# Patient Record
Sex: MALE | Race: WHITE
[De-identification: names, ages, dates, MRNs, and addresses within clinical notes are randomized per-mention and may not be internally consistent; named-entity substitution may affect disease eponyms.]

---

## 2019-01-28 ENCOUNTER — HOSPITAL ENCOUNTER (EMERGENCY)
Dept: HOSPITAL 47 - EC | Age: 79
Discharge: HOME | End: 2019-01-28
Payer: MEDICARE

## 2019-01-28 VITALS — TEMPERATURE: 97.4 F | RESPIRATION RATE: 18 BRPM

## 2019-01-28 VITALS — HEART RATE: 74 BPM | SYSTOLIC BLOOD PRESSURE: 179 MMHG | DIASTOLIC BLOOD PRESSURE: 89 MMHG

## 2019-01-28 DIAGNOSIS — Z79.02: ICD-10-CM

## 2019-01-28 DIAGNOSIS — R31.0: Primary | ICD-10-CM

## 2019-01-28 DIAGNOSIS — Z79.899: ICD-10-CM

## 2019-01-28 DIAGNOSIS — Z79.890: ICD-10-CM

## 2019-01-28 DIAGNOSIS — N28.1: ICD-10-CM

## 2019-01-28 DIAGNOSIS — I10: ICD-10-CM

## 2019-01-28 DIAGNOSIS — Z79.82: ICD-10-CM

## 2019-01-28 LAB
ALBUMIN SERPL-MCNC: 3.8 G/DL (ref 3.5–5)
ALP SERPL-CCNC: 35 U/L (ref 38–126)
ALT SERPL-CCNC: 32 U/L (ref 21–72)
ANION GAP SERPL CALC-SCNC: 5 MMOL/L
AST SERPL-CCNC: 19 U/L (ref 17–59)
BASOPHILS # BLD AUTO: 0.1 K/UL (ref 0–0.2)
BASOPHILS NFR BLD AUTO: 1 %
BUN SERPL-SCNC: 28 MG/DL (ref 9–20)
CALCIUM SPEC-MCNC: 9.4 MG/DL (ref 8.4–10.2)
CHLORIDE SERPL-SCNC: 106 MMOL/L (ref 98–107)
CO2 SERPL-SCNC: 28 MMOL/L (ref 22–30)
EOSINOPHIL # BLD AUTO: 0.2 K/UL (ref 0–0.7)
EOSINOPHIL NFR BLD AUTO: 3 %
ERYTHROCYTE [DISTWIDTH] IN BLOOD BY AUTOMATED COUNT: 4.74 M/UL (ref 4.3–5.9)
ERYTHROCYTE [DISTWIDTH] IN BLOOD: 13 % (ref 11.5–15.5)
GLUCOSE SERPL-MCNC: 107 MG/DL (ref 74–99)
HCT VFR BLD AUTO: 42.9 % (ref 39–53)
HGB BLD-MCNC: 14.3 GM/DL (ref 13–17.5)
LYMPHOCYTES # SPEC AUTO: 1 K/UL (ref 1–4.8)
LYMPHOCYTES NFR SPEC AUTO: 15 %
MCH RBC QN AUTO: 30.2 PG (ref 25–35)
MCHC RBC AUTO-ENTMCNC: 33.3 G/DL (ref 31–37)
MCV RBC AUTO: 90.6 FL (ref 80–100)
MONOCYTES # BLD AUTO: 0.4 K/UL (ref 0–1)
MONOCYTES NFR BLD AUTO: 6 %
NEUTROPHILS # BLD AUTO: 5.1 K/UL (ref 1.3–7.7)
NEUTROPHILS NFR BLD AUTO: 74 %
PH UR: 6 [PH] (ref 5–8)
PLATELET # BLD AUTO: 203 K/UL (ref 150–450)
POTASSIUM SERPL-SCNC: 4.7 MMOL/L (ref 3.5–5.1)
PROT SERPL-MCNC: 6.3 G/DL (ref 6.3–8.2)
PROT UR QL: (no result)
RBC UR QL: 3 /HPF (ref 0–5)
SODIUM SERPL-SCNC: 139 MMOL/L (ref 137–145)
SP GR UR: 1 (ref 1–1.03)
SQUAMOUS UR QL AUTO: <1 /HPF (ref 0–4)
UROBILINOGEN UR QL STRIP: <2 MG/DL (ref ?–2)
WBC # BLD AUTO: 6.9 K/UL (ref 3.8–10.6)
WBC #/AREA URNS HPF: 3 /HPF (ref 0–5)

## 2019-01-28 PROCEDURE — 80053 COMPREHEN METABOLIC PANEL: CPT

## 2019-01-28 PROCEDURE — 51798 US URINE CAPACITY MEASURE: CPT

## 2019-01-28 PROCEDURE — 36415 COLL VENOUS BLD VENIPUNCTURE: CPT

## 2019-01-28 PROCEDURE — 85025 COMPLETE CBC W/AUTO DIFF WBC: CPT

## 2019-01-28 PROCEDURE — 82550 ASSAY OF CK (CPK): CPT

## 2019-01-28 PROCEDURE — 76770 US EXAM ABDO BACK WALL COMP: CPT

## 2019-01-28 PROCEDURE — 96360 HYDRATION IV INFUSION INIT: CPT

## 2019-01-28 PROCEDURE — 81001 URINALYSIS AUTO W/SCOPE: CPT

## 2019-01-28 PROCEDURE — 99283 EMERGENCY DEPT VISIT LOW MDM: CPT

## 2019-01-28 NOTE — US
EXAMINATION TYPE: US kidneys/renal and bladder

 

DATE OF EXAM: 1/28/2019

 

COMPARISON:

 

CLINICAL HISTORY: Pain. Hematuria, burning during urination

 

EXAM MEASUREMENTS:

 

Right Kidney:  8.4 x 4.9 x 4.8 cm

Left Kidney: 9.4 x 4.5 x 5.2 cm

 

 

 

Right Kidney: Cortical cystic appearing lesion seen lower pole = 0.8 x 0.6 x 0.6 cm.  Cortical thinni
ng.     

Left Kidney: Cortical thinning.    

Bladder: distended, wnl as visualized.  Prominent prostate visualized.  

**Bilateral Jets seen

 

 

There is no evidence for hydronephrosis at this point in time.  No nephrolithiasis is seen.  No solid
 masses are identified.  The urinary bladder is anechoic.  Bilateral ureteral jets are seen.

 

 

 

IMPRESSION:

1. Renal parenchymal thinning noted.

2. Right renal cyst identified.

## 2019-01-28 NOTE — ED
General Adult HPI





- General


Chief complaint: Urogenital


Stated complaint: Rectal Bleeding


Time Seen by Provider: 01/28/19 10:02


Source: patient, RN notes reviewed


Mode of arrival: ambulatory


Limitations: no limitations





- History of Present Illness


Initial comments: 





78-year-old male presents to the emergency department for a chief complaint of 

blood in urine.  Patient states this started last night.  Patient states he 

passes clots in his urine.  He states it is sometimes difficult to initiate 

stream of urine due to these clots.  Patient denies any significant pain.  

Patient has had prostate surgery in the past. Patient has no other complaints 

at this time including shortness of breath, chest pain, abdominal pain, nausea 

or vomiting, headache, or visual changes.





- Related Data


 Home Medications











 Medication  Instructions  Recorded  Confirmed


 


Aspirin EC [Ecotrin Low Dose] 81 mg PO DAILY 01/28/19 01/28/19


 


Carvedilol [Coreg] 12.5 mg PO BID 01/28/19 01/28/19


 


Cholecalciferol (Vitamin D3) 2,000 unit PO DAILY 01/28/19 01/28/19





[Vitamin D3]   


 


Levothyroxine Sodium [Synthroid] 25 mcg PO DAILY 01/28/19 01/28/19


 


Olmesartan Medoxomil [Benicar] 40 mg PO DAILY 01/28/19 01/28/19


 


Pravastatin Sodium [Pravachol] 20 mg PO DAILY 01/28/19 01/28/19


 


amLODIPine [Norvasc] 10 mg PO DAILY 01/28/19 01/28/19











 Allergies











Allergy/AdvReac Type Severity Reaction Status Date / Time


 


No Known Allergies Allergy   Verified 01/28/19 09:56














Review of Systems


ROS Statement: 


Those systems with pertinent positive or pertinent negative responses have been 

documented in the HPI.





ROS Other: All systems not noted in ROS Statement are negative.





Past Medical History


Past Medical History: GI Bleed, Hypertension, Prostate Disorder


History of Any Multi-Drug Resistant Organisms: None Reported


Past Surgical History: Prostate Surgery


Past Anesthesia/Blood Transfusion Reactions: No Reported Reaction


Past Psychological History: No Psychological Hx Reported


Smoking Status: Never smoker


Past Alcohol Use History: None Reported


Past Drug Use History: None Reported





General Exam


Limitations: no limitations


General appearance: alert, in no apparent distress


Head exam: Present: atraumatic, normocephalic, normal inspection


Eye exam: Present: normal appearance, PERRL, EOMI.  Absent: scleral icterus, 

conjunctival injection, periorbital swelling


ENT exam: Present: normal exam, mucous membranes moist


Neck exam: Present: normal inspection, full ROM.  Absent: tenderness, 

meningismus, lymphadenopathy


Respiratory exam: Present: normal lung sounds bilaterally.  Absent: respiratory 

distress, wheezes, rales, rhonchi, stridor


Cardiovascular Exam: Present: regular rate, normal rhythm, normal heart sounds.

  Absent: systolic murmur, diastolic murmur, rubs, gallop, clicks


GI/Abdominal exam: Present: soft, normal bowel sounds.  Absent: distended, 

tenderness, guarding, rebound, rigid


Neurological exam: Present: alert, oriented X3, CN II-XII intact


Psychiatric exam: Present: normal affect, normal mood





Course


 Vital Signs











  01/28/19 01/28/19





  09:43 11:38


 


Temperature 97.4 F L 


 


Pulse Rate 84 72


 


Respiratory 18 18





Rate  


 


Blood Pressure 191/89 181/87


 


O2 Sat by Pulse 97 99





Oximetry  














Medical Decision Making





- Medical Decision Making





78-year-old male presents to the emergency department for a chief complaint of 

hematuria which started last night.  Patient is passing clots.  Patient is able 

to urinate in the emergency department.  Postvoid bladder scan is 60.  No 

significant pain noted.  CBC CMP unremarkable.  Creatinine 2.02 which is at 

patient's baseline.  Urine does show large blood however only 3 blood cells.  

Therefore creatine kinase was ordered to evaluate for rhabdomyolysis which is 

within normal limits.  Ultrasound showed a right renal cyst, no solid masses 

identified.  There is also renal parenchymal thinning noted.  At this time 

hemoglobin is stable.  Patient can follow up outpatient with urology and return 

if he has any worsening symptoms.





- Lab Data


Result diagrams: 


 01/28/19 10:45





 01/28/19 10:45


 Lab Results











  01/28/19 01/28/19 01/28/19 Range/Units





  10:45 10:45 10:45 


 


WBC  6.9    (3.8-10.6)  k/uL


 


RBC  4.74    (4.30-5.90)  m/uL


 


Hgb  14.3    (13.0-17.5)  gm/dL


 


Hct  42.9    (39.0-53.0)  %


 


MCV  90.6    (80.0-100.0)  fL


 


MCH  30.2    (25.0-35.0)  pg


 


MCHC  33.3    (31.0-37.0)  g/dL


 


RDW  13.0    (11.5-15.5)  %


 


Plt Count  203    (150-450)  k/uL


 


Neutrophils %  74    %


 


Lymphocytes %  15    %


 


Monocytes %  6    %


 


Eosinophils %  3    %


 


Basophils %  1    %


 


Neutrophils #  5.1    (1.3-7.7)  k/uL


 


Lymphocytes #  1.0    (1.0-4.8)  k/uL


 


Monocytes #  0.4    (0-1.0)  k/uL


 


Eosinophils #  0.2    (0-0.7)  k/uL


 


Basophils #  0.1    (0-0.2)  k/uL


 


Sodium   139   (137-145)  mmol/L


 


Potassium   4.7   (3.5-5.1)  mmol/L


 


Chloride   106   ()  mmol/L


 


Carbon Dioxide   28   (22-30)  mmol/L


 


Anion Gap   5   mmol/L


 


BUN   28 H   (9-20)  mg/dL


 


Creatinine   2.02 H   (0.66-1.25)  mg/dL


 


Est GFR (CKD-EPI)AfAm   36   (>60 ml/min/1.73 sqM)  


 


Est GFR (CKD-EPI)NonAf   31   (>60 ml/min/1.73 sqM)  


 


Glucose   107 H   (74-99)  mg/dL


 


Calcium   9.4   (8.4-10.2)  mg/dL


 


Total Bilirubin   1.1   (0.2-1.3)  mg/dL


 


AST   19   (17-59)  U/L


 


ALT   32   (21-72)  U/L


 


Alkaline Phosphatase   35 L   ()  U/L


 


Creatine Kinase    102  ()  U/L


 


Total Protein   6.3   (6.3-8.2)  g/dL


 


Albumin   3.8   (3.5-5.0)  g/dL


 


Urine Color     


 


Urine Appearance     (Clear)  


 


Urine pH     (5.0-8.0)  


 


Ur Specific Gravity     (1.001-1.035)  


 


Urine Protein     (Negative)  


 


Urine Glucose (UA)     (Negative)  


 


Urine Ketones     (Negative)  


 


Urine Blood     (Negative)  


 


Urine Nitrite     (Negative)  


 


Urine Bilirubin     (Negative)  


 


Urine Urobilinogen     (<2.0)  mg/dL


 


Ur Leukocyte Esterase     (Negative)  


 


Urine RBC     (0-5)  /hpf


 


Urine WBC     (0-5)  /hpf


 


Ur Squamous Epith Cells     (0-4)  /hpf


 


Urine Bacteria     (None)  /hpf


 


Urine Mucus     (None)  /hpf














  01/28/19 Range/Units





  11:20 


 


WBC   (3.8-10.6)  k/uL


 


RBC   (4.30-5.90)  m/uL


 


Hgb   (13.0-17.5)  gm/dL


 


Hct   (39.0-53.0)  %


 


MCV   (80.0-100.0)  fL


 


MCH   (25.0-35.0)  pg


 


MCHC   (31.0-37.0)  g/dL


 


RDW   (11.5-15.5)  %


 


Plt Count   (150-450)  k/uL


 


Neutrophils %   %


 


Lymphocytes %   %


 


Monocytes %   %


 


Eosinophils %   %


 


Basophils %   %


 


Neutrophils #   (1.3-7.7)  k/uL


 


Lymphocytes #   (1.0-4.8)  k/uL


 


Monocytes #   (0-1.0)  k/uL


 


Eosinophils #   (0-0.7)  k/uL


 


Basophils #   (0-0.2)  k/uL


 


Sodium   (137-145)  mmol/L


 


Potassium   (3.5-5.1)  mmol/L


 


Chloride   ()  mmol/L


 


Carbon Dioxide   (22-30)  mmol/L


 


Anion Gap   mmol/L


 


BUN   (9-20)  mg/dL


 


Creatinine   (0.66-1.25)  mg/dL


 


Est GFR (CKD-EPI)AfAm   (>60 ml/min/1.73 sqM)  


 


Est GFR (CKD-EPI)NonAf   (>60 ml/min/1.73 sqM)  


 


Glucose   (74-99)  mg/dL


 


Calcium   (8.4-10.2)  mg/dL


 


Total Bilirubin   (0.2-1.3)  mg/dL


 


AST   (17-59)  U/L


 


ALT   (21-72)  U/L


 


Alkaline Phosphatase   ()  U/L


 


Creatine Kinase   ()  U/L


 


Total Protein   (6.3-8.2)  g/dL


 


Albumin   (3.5-5.0)  g/dL


 


Urine Color  Light Red  


 


Urine Appearance  Cloudy  (Clear)  


 


Urine pH  6.0  (5.0-8.0)  


 


Ur Specific Gravity  1.005  (1.001-1.035)  


 


Urine Protein  2+ H  (Negative)  


 


Urine Glucose (UA)  Negative  (Negative)  


 


Urine Ketones  Negative  (Negative)  


 


Urine Blood  Large H  (Negative)  


 


Urine Nitrite  Negative  (Negative)  


 


Urine Bilirubin  Negative  (Negative)  


 


Urine Urobilinogen  <2.0  (<2.0)  mg/dL


 


Ur Leukocyte Esterase  Trace H  (Negative)  


 


Urine RBC  3  (0-5)  /hpf


 


Urine WBC  3  (0-5)  /hpf


 


Ur Squamous Epith Cells  <1  (0-4)  /hpf


 


Urine Bacteria  Occasional H  (None)  /hpf


 


Urine Mucus  Rare H  (None)  /hpf














Disposition


Clinical Impression: 


 Gross hematuria





Disposition: HOME SELF-CARE


Condition: Good


Instructions (If sedation given, give patient instructions):  Hematuria (ED)


Additional Instructions: 


Please follow up with urology in 1-2 days.  If you are unable to urinate return 

to the emergency department.  If you have any other worsening symptoms return 

to the emergency department.


Is patient prescribed a controlled substance at d/c from ED?: No


Referrals: 


Spring Cardenas MD [Primary Care Provider] - 1-2 days


Seth Guidry MD [STAFF PHYSICIAN] - 1-2 days


Time of Disposition: 12:45